# Patient Record
Sex: MALE
[De-identification: names, ages, dates, MRNs, and addresses within clinical notes are randomized per-mention and may not be internally consistent; named-entity substitution may affect disease eponyms.]

---

## 2018-01-15 ENCOUNTER — HOSPITAL ENCOUNTER (EMERGENCY)
Dept: HOSPITAL 31 - C.ER | Age: 58
Discharge: HOME | End: 2018-01-15
Payer: SELF-PAY

## 2018-01-15 VITALS
TEMPERATURE: 99.2 F | HEART RATE: 80 BPM | DIASTOLIC BLOOD PRESSURE: 81 MMHG | SYSTOLIC BLOOD PRESSURE: 120 MMHG | OXYGEN SATURATION: 97 % | RESPIRATION RATE: 18 BRPM

## 2018-01-15 DIAGNOSIS — J20.9: Primary | ICD-10-CM

## 2018-01-15 NOTE — C.PDOC
History Of Present Illness


57 yr old male presents to the ER with complaints of body aches, nasal 

congestion, dry cough and low grade fever for the past few days. Patient 

reports sick contact with son who is also a patient in the ER. Denies high fever

, lethargy, drooling, chest pain, SOB, vomiting, headache, weakness or 

numbness. 


Time Seen by Provider: 01/15/18 18:13


Chief Complaint (Nursing): Flu-like Symptoms


History Per: Patient


History/Exam Limitations: no limitations


Current Symptoms Are (Timing): Still Present


Sick Contacts (Context): Family Member(s) (son)





Past Medical History


Reviewed: Historical Data, Nursing Documentation, Vital Signs


Vital Signs: 


 Last Vital Signs











Temp  99.2 F   01/15/18 18:02


 


Pulse  80   01/15/18 18:02


 


Resp  18   01/15/18 18:02


 


BP  120/81   01/15/18 18:02


 


Pulse Ox  97   01/15/18 19:08











Family History: States: No Known Family Hx





- Social History


Hx Alcohol Use: No


Hx Substance Use: No





- Immunization History


Hx Influenza Vaccination: No





Review Of Systems


Except As Marked, All Systems Reviewed And Found Negative.


Constitutional: Positive for: Fever (low grade), Other ((+) body aches)


ENT: Positive for: Nose Congestion


Cardiovascular: Negative for: Chest Pain


Respiratory: Positive for: Cough (dry).  Negative for: Shortness of Breath


Gastrointestinal: Negative for: Vomiting


Neurological: Negative for: Weakness, Numbness, Headache





Physical Exam





- Physical Exam


Appears: Well, Non-toxic, No Acute Distress


Skin: Warm, Dry, No Rash


Eye(s): bilateral: PERRL


Ear(s): Bilateral: Normal


Nose: Discharge (clear rhinorrhea)


Oral Mucosa: Moist, No Drooling


Lips: Normal Appearing


Throat: Erythema (mild)


Neck: Trachea Midline, Supple


Cardiovascular: Rhythm Regular, No Murmur, No JVD


Respiratory: No Decreased Breath Sounds, No Accessory Muscle Use, No Rales, No 

Rhonchi, No Stridor, No Wheezing


Gastrointestinal/Abdominal: Soft, No Tenderness


Extremity: Normal ROM, No Pedal Edema, No Deformity, No Swelling


Neurological/Psych: Oriented x3, Normal Speech





ED Course And Treatment


O2 Sat by Pulse Oximetry: 97 (RA)


Pulse Ox Interpretation: Normal





- Radiology


CXR: Interpreted by Me, Viewed By Me


CXR Interpretation: Yes: No Acute Disease


Progress Note: On re-eval, pt is awake, alert, not in any apparent distress.  

Afebrile, hemodynamicaly stable.  non-toxic. Tolerate PO well in ED.  PulseOx 97

% RA.  Neck: SUpple, (-) JVD, (-) meningeal sign.  ENT: No acute findings. 

uvula m idline, no edema.  Lungs: CTA B/L, BS equal B/L.  ABd: benign.  

Neurologicaly intact.  CXR- appears normal.  Pt has clinical findings c/w acute 

bronchitis.  Pt advised and ref. to F/U with PMD in 1-2 days for re-eavl.  

return to ED at any time if any worsening or new changes.





Medical Decision Making


Medical Decision Making: 


PLAN:


* CXR


* Motrin PO 


* Zithromax PO 


* Phenergan PO 








Disposition


Counseled Patient/Family Regarding: Studies Performed, Diagnosis, Need For 

Followup, Rx Given





- Disposition


Referrals: 


[facility] [Outside]


Disposition: HOME/ ROUTINE


Disposition Time: 19:00


Condition: STABLE


Additional Instructions: 


ENCOURAGE FLUIDS





TAKE MEDICATION AS PRESCRIBED





FOLLOW UP WITH PMD IN 2-3 DAYS FOR RE-EVALUATION.


RETURN TO ED IF ANY WORSENING OR NEW CHANGES.


Prescriptions: 


Albuterol HFA [Ventolin HFA 90 mcg/actuation (8 g)] 1 puff IH Q6 #1 inhaler


Azithromycin [Zithromax] 250 mg PO DAILY #4 tab


Benzonatate [Tessalon Perle] 100 mg PO TID #14 capsule


Instructions:  Acute Bronchitis (ED)


Forms:  [facility] (English)





- Clinical Impression


Clinical Impression: 


 Bronchitis








- PA / NP / Resident Statement


MD/DO has reviewed & agrees with the documentation as recorded.





- Scribe Statement


The provider has reviewed the documentation as recorded by the Scribe


Stefanie Holden





All medical record entries made by the Scribe were at my direction and 

personally dictated by me. I have reviewed the chart and agree that the record 

accurately reflects my personal performance of the history, physical exam, 

medical decision making, and the department course for this patient. I have 

also personally directed, reviewed, and agree with the discharge instructions 

and disposition.

## 2018-01-16 NOTE — RAD
HISTORY:

Cough  



COMPARISON:

No prior.



TECHNIQUE:

Chest PA and lateral



FINDINGS:



LUNGS:

No consolidation. 6 mm nodular opacity medial right lung base 

-appears too peripheral to be a prominent  vessel on end. Not 

localized on lateral view. 



PLEURA:

No significant pleural effusion identified. No pneumothorax apparent.



CARDIOVASCULAR:

Normal.



OSSEOUS STRUCTURES:

Bilateral shoulder mild arthrosis. Thoracic spondylosis



VISUALIZED UPPER ABDOMEN:

Normal.



OTHER FINDINGS:

None.



IMPRESSION:

No infiltrate



Five-6 mm rounded nodule medial right lung base -consider CT chest to 

clarify